# Patient Record
Sex: MALE | Race: WHITE | NOT HISPANIC OR LATINO | URBAN - METROPOLITAN AREA
[De-identification: names, ages, dates, MRNs, and addresses within clinical notes are randomized per-mention and may not be internally consistent; named-entity substitution may affect disease eponyms.]

---

## 2019-04-01 ENCOUNTER — EMERGENCY (EMERGENCY)
Facility: HOSPITAL | Age: 29
LOS: 1 days | Discharge: HOME | End: 2019-04-01
Attending: EMERGENCY MEDICINE
Payer: COMMERCIAL

## 2019-04-01 VITALS
OXYGEN SATURATION: 96 % | SYSTOLIC BLOOD PRESSURE: 126 MMHG | TEMPERATURE: 98 F | HEART RATE: 78 BPM | RESPIRATION RATE: 16 BRPM | DIASTOLIC BLOOD PRESSURE: 78 MMHG

## 2019-04-01 VITALS
WEIGHT: 225.09 LBS | HEART RATE: 88 BPM | OXYGEN SATURATION: 96 % | RESPIRATION RATE: 18 BRPM | DIASTOLIC BLOOD PRESSURE: 82 MMHG | SYSTOLIC BLOOD PRESSURE: 120 MMHG | TEMPERATURE: 98 F | HEIGHT: 74 IN

## 2019-04-01 PROCEDURE — 99282 EMERGENCY DEPT VISIT SF MDM: CPT

## 2019-04-01 PROCEDURE — 99283 EMERGENCY DEPT VISIT LOW MDM: CPT

## 2019-04-01 NOTE — ED PROVIDER NOTE - NS ED ROS FT
Constitutional: no fever and no chills  Eyes: (+) L eye pain and blurry vision, no discharge  ENMT: no ear pain or hearing loss, no dysphagia or throat pain  Neck: no pain, no stiffness, no swollen glands  CV: no chest pain, no palpitations, no edema  Resp: no cough, no shortness of breath  Abd: no abdominal pain, no nausea or vomiting, no diarrhea  : no dysuria, no hematuria  MSK: no back pain, no neck pain, no joint pain  Neuro: no LOC, no gait abnormality, no headache, no sensory deficits, no weakness  Skin: no rashes, no lacerations, no lesions

## 2019-04-01 NOTE — ED ADULT NURSE NOTE - OBJECTIVE STATEMENT
29y/o male walked into ED a&ox3 c/o eye injury. 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 States he did not feel much discomfort at the time but woke up Saturday with severe sharp pain to left eye that has been getting progressively worse. Also endorses blurry vision to left eye. Has tried flushing out eye with no relief. Went to urgent care today and was told he failed the vision test via Snellen chart. Sent here for further eval by optho. Patient presents with watery reddened left eye. Reports sharp pain to affected eye. Denies any other complaints at this time. 27y/o male walked into ED a&ox3 c/o eye injury. 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 States he did not feel much discomfort at the time but woke up Saturday with severe sharp pain to left eye that has been getting progressively worse. Also endorses blurry vision to left eye. Has tried flushing out eye with no relief. Went to urgent care today and was told he failed the vision test via Snellen chart. Sent here for further eval by optho. Patient presents with watery reddened left eye. Reports sharp pain to affected eye. Denies any other complaints at this time. MD Koroma at bedside for eval

## 2019-04-01 NOTE — ED PROVIDER NOTE - NSFOLLOWUPCLINICS_GEN_ALL_ED_FT
[facility]  Ophthalmology  [address] [place] [address] [address]  [place] [address]  Phone: [phone]  Fax:   Follow Up Time: 1-3 Days

## 2019-04-01 NOTE — ED PROVIDER NOTE - OBJECTIVE STATEMENT
29 y/o M no PMH presenting with L eye pain worsening over past 4 days due to possible foreign body. 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 Also with worsening blurry vision in that eye. He denies any fevers/chills, headache, eye discharge, ear pain, throat pain, neck pain, chest pain, SOB, abd pain, n/v/d, skin rash or any other complaints. 27 y/o M no PMH presenting with L eye pain worsening over past 4 days due to possible foreign body. 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 Also with worsening blurry vision in that eye. He denies any fevers/chills, headache, eye discharge, ear pain, throat pain, neck pain, chest pain, SOB, abd pain, n/v/d, skin rash or any other complaints.

## 2019-04-01 NOTE — ED PROVIDER NOTE - PHYSICAL EXAMINATION
PHYSICAL EXAM:  GENERAL: Sitting comfortable in bed, in no acute distress  HENMT: Atraumatic, moist mucous membranes, no oropharyngeal exudates or vesicles, uvula is midline\  EYES: (+) left injected sclera, visual acuity 20/40 on left and 20/25 on right, PERRL, EOMs intact b/l  HEART: RRR, S1/S2, no murmur/gallops/rubs  RESPIRATORY: Clear to auscultation bilaterally, no wheezes/rhonchi/rales  ABDOMEN: +BS, soft, nontender, nondistended  EXTREMITIES: No lower extremity edema, +2 radial pulses b/l  NEURO:  A&Ox4, no focal motor deficits or sensory deficits   Heme/LYMPH: No ecchymosis or bruising, no anterior/posterior cervical or supraclavicular LAD  SKIN:  Skin normal color for race, warm, dry and intact. No evidence of rash.

## 2019-04-01 NOTE — ED PROVIDER NOTE - NSFOLLOWUPINSTRUCTIONS_ED_ALL_ED_FT
You were seen in the ER for eye pain after possibly getting cement in it at work. We irrigated your eye with 3L of fluid and did a slit lamp exam and staining that showed no foreign bodies or evidence of abrasion or ulceration of your eye. You will be discharged home and should follow-up with ophthalmology clinic tomorrow (contact information to make appointment for tomorrow above). You can use over the counter eye drop lubricant for comfort. Return to the ER for severe worsening eye pain/irritation, loss of vision, fevers, eye discharge, severe headache, vomiting, or any other new or concerning symptoms.

## 2019-04-01 NOTE — ED PROVIDER NOTE - PROGRESS NOTE DETAILS
Eye irrigation via sarita flush catheter 1L initiated. Eye fluid pH 6 prior to irrigation starting. Eye fluid pH 7, will give another 1L eye irrigation. Eye fluid pH 7, pt reports sensation foreign object in eye, none visualized on gross exam. Will continue with another 1L eye irrigation. Pt reports pain discomfort somewhat improved with irrigation but persists. Fluorescein and slit lamp exam performed with no obvious foreign body, corneal abrasion, or evidence of iritis. Pt will be discharged home with referral for ophtho clinic follow-up tomorrow and return precautions. Eye irrigation via sarita flush catheter 1L initiated, tetracaine drops for analgesia. Eye fluid pH 6 prior to irrigation starting. Eye fluid pH 7, pt reports persistent sensation of foreign object in eye, none visualized on gross exam. Will continue with another 1L eye irrigation.

## 2019-04-01 NOTE — ED PROVIDER NOTE - ATTENDING CONTRIBUTION TO CARE
I have seen and evaluated this patient with the resident.   I agree with the findings  unless other wise stated.  I have made appropriate changes in documentations where needed, After my face to face bedside evaluation, I am further  notin27 y/o M no PMH presenting with L eye pain worsening over past 4 days due to possible foreign body 2/2 cement from jackhammer at work. No fevers or other complaints. (+) L scleral injection on exam and decreased visual acuity. No obvious foreign bodies seen. Will initiate fluid irrigation of left eye through sarita flush catheter for possible alkali injury 2/2 cement and will follow with fluorescein and slit lamp examination. neg for abrasions or changes of irirtis  or ulcer will have eye clinic f/u --Koroma

## 2019-05-22 ENCOUNTER — APPOINTMENT (OUTPATIENT)
Dept: ORTHOPEDIC SURGERY | Facility: CLINIC | Age: 29
End: 2019-05-22
Payer: COMMERCIAL

## 2019-05-22 VITALS
DIASTOLIC BLOOD PRESSURE: 76 MMHG | WEIGHT: 225 LBS | HEIGHT: 74 IN | SYSTOLIC BLOOD PRESSURE: 118 MMHG | HEART RATE: 93 BPM | BODY MASS INDEX: 28.88 KG/M2

## 2019-05-22 DIAGNOSIS — Z87.891 PERSONAL HISTORY OF NICOTINE DEPENDENCE: ICD-10-CM

## 2019-05-22 DIAGNOSIS — S70.12XA CONTUSION OF LEFT THIGH, INITIAL ENCOUNTER: ICD-10-CM

## 2019-05-22 DIAGNOSIS — F19.90 OTHER PSYCHOACTIVE SUBSTANCE USE, UNSPECIFIED, UNCOMPLICATED: ICD-10-CM

## 2019-05-22 DIAGNOSIS — Z78.9 OTHER SPECIFIED HEALTH STATUS: ICD-10-CM

## 2019-05-22 DIAGNOSIS — S76.109A UNSPECIFIED INJURY OF UNSPECIFIED QUADRICEPS MUSCLE, FASCIA AND TENDON, INITIAL ENCOUNTER: ICD-10-CM

## 2019-05-22 PROCEDURE — 99203 OFFICE O/P NEW LOW 30 MIN: CPT

## 2019-05-22 PROCEDURE — 73552 X-RAY EXAM OF FEMUR 2/>: CPT | Mod: LT

## 2019-05-22 PROCEDURE — 73502 X-RAY EXAM HIP UNI 2-3 VIEWS: CPT | Mod: LT

## 2019-05-22 RX ORDER — OMEPRAZOLE 40 MG/1
CAPSULE, DELAYED RELEASE ORAL
Refills: 0 | Status: ACTIVE | COMMUNITY

## 2019-05-22 RX ORDER — MELOXICAM 15 MG/1
15 TABLET ORAL
Qty: 60 | Refills: 2 | Status: ACTIVE | COMMUNITY
Start: 2019-05-22 | End: 1900-01-01

## 2019-05-22 NOTE — DISCUSSION/SUMMARY
[de-identified] : 29-year-old male with left quadriceps contusion/strain, hematoma. Options discussed. Recommend rest, ice, Mobic, side effects discussed. He will follow up with Dr. Lai if symptoms are not improving within 2-3 weeks. All questions answered expresses understanding

## 2019-05-22 NOTE — PHYSICAL EXAM
[de-identified] : General Exam\par \par Well developed, well nourished\par No apparent distress\par Oriented to person, place, and time\par Mood: Normal\par Affect: Normal\par Balance and coordination: Normal\par Gait: Normal\par \par Left hip exam\par \par Skin: Clean/dry and intact\par Inspection: No obvious deformity, + swelling quadriceps, no ecchymosis.\par Tenderness: +ttp anterior quadriceps, no tenderness over greater trochanter/glut medius insertion. No tenderness pubic symphysis, pubic tubercle, hip flexors. No ttp ischial tuberosity or buttock. No ttp over the ASIS/[place] crest.\par ROM: 0-120°. Internal rotation 30 external rotation 70\par Painful ROM: None\par Additional tests: able to SLR, No pain with circumduction negative impingement test at 90° neg impingement test at 60° negative Doug [facility]\par Strength: 5/5 hip flexion/ADD/ABD/Q/H/TA/GS/EHL\par Neuro: Sensation in tact to light touch throughout in dp/sp/tib/angle/saph distributions\par Pulses: 2+ DP/PT pulses [de-identified] : 2 views L femur/hip obtained.  Preliminary report- no acute fx/dislocation noted. \par

## 2019-05-22 NOTE — HISTORY OF PRESENT ILLNESS
[de-identified] : 29-year-old male presents complaining of left anterior thigh pain for 2 days. He dropped a 90 pound jackhammer onto his quadriceps muscles. He developed immediate pain. No bruising developed. He does states there is swelling. Pain with ambulation. Overall symptoms are stable. He does not revealing a pop around the area. No hip pain. Denies numbness tingling, instability, weakness.\par \par The patient's past medical history, past surgical history, medications, allergies, and social history were reviewed by me today with the patient and documented accordingly. In addition, the patient's family history, which is noncontributory to this visit, was also reviewed.\par

## 2019-05-23 PROBLEM — Z78.9 OTHER SPECIFIED HEALTH STATUS: Chronic | Status: ACTIVE | Noted: 2019-04-01

## 2019-05-27 PROBLEM — S76.109A INJURY OF QUADRICEPS MUSCLE: Status: ACTIVE | Noted: 2019-05-22

## 2019-06-05 ENCOUNTER — APPOINTMENT (OUTPATIENT)
Dept: ORTHOPEDIC SURGERY | Facility: CLINIC | Age: 29
End: 2019-06-05

## 2019-12-11 ENCOUNTER — APPOINTMENT (OUTPATIENT)
Dept: PSYCHIATRY | Facility: CLINIC | Age: 29
End: 2019-12-11
Payer: COMMERCIAL

## 2019-12-11 DIAGNOSIS — Z81.8 FAMILY HISTORY OF OTHER MENTAL AND BEHAVIORAL DISORDERS: ICD-10-CM

## 2019-12-11 PROCEDURE — 99205 OFFICE O/P NEW HI 60 MIN: CPT

## 2019-12-13 PROBLEM — Z81.8 FAMILY HISTORY OF DEPRESSION: Status: ACTIVE | Noted: 2019-12-13

## 2020-01-02 ENCOUNTER — MEDICATION RENEWAL (OUTPATIENT)
Age: 30
End: 2020-01-02

## 2020-01-09 ENCOUNTER — APPOINTMENT (OUTPATIENT)
Dept: PSYCHIATRY | Facility: CLINIC | Age: 30
End: 2020-01-09

## 2020-01-22 ENCOUNTER — RX RENEWAL (OUTPATIENT)
Age: 30
End: 2020-01-22

## 2020-11-17 ENCOUNTER — APPOINTMENT (OUTPATIENT)
Dept: PSYCHIATRY | Facility: CLINIC | Age: 30
End: 2020-11-17

## 2020-11-24 ENCOUNTER — APPOINTMENT (OUTPATIENT)
Dept: PSYCHIATRY | Facility: CLINIC | Age: 30
End: 2020-11-24
Payer: COMMERCIAL

## 2020-11-24 DIAGNOSIS — F41.0 PANIC DISORDER [EPISODIC PAROXYSMAL ANXIETY]: ICD-10-CM

## 2020-11-24 DIAGNOSIS — F41.1 GENERALIZED ANXIETY DISORDER: ICD-10-CM

## 2020-11-24 PROCEDURE — 99214 OFFICE O/P EST MOD 30 MIN: CPT

## 2020-11-24 RX ORDER — ESCITALOPRAM OXALATE 10 MG/1
10 TABLET ORAL
Qty: 30 | Refills: 0 | Status: ACTIVE | COMMUNITY
Start: 2019-12-12 | End: 1900-01-01

## 2020-11-25 PROBLEM — F41.0 PANIC ATTACKS: Status: ACTIVE | Noted: 2019-12-12

## 2020-11-25 PROBLEM — F41.1 GAD (GENERALIZED ANXIETY DISORDER): Status: ACTIVE | Noted: 2019-12-12

## 2021-10-30 ENCOUNTER — APPOINTMENT (OUTPATIENT)
Dept: DISASTER EMERGENCY | Facility: OTHER | Age: 31
End: 2021-10-30
Payer: COMMERCIAL

## 2021-10-30 PROCEDURE — 0012A: CPT

## 2023-03-13 ENCOUNTER — RX ONLY (RX ONLY)
Age: 33
End: 2023-03-13

## 2023-03-13 ENCOUNTER — OFFICE (OUTPATIENT)
Dept: URBAN - METROPOLITAN AREA CLINIC 109 | Facility: CLINIC | Age: 33
Setting detail: OPHTHALMOLOGY
End: 2023-03-13
Payer: COMMERCIAL

## 2023-03-13 DIAGNOSIS — H00.14: ICD-10-CM

## 2023-03-13 PROCEDURE — 99203 OFFICE O/P NEW LOW 30 MIN: CPT | Performed by: OPHTHALMOLOGY

## 2023-03-13 ASSESSMENT — REFRACTION_AUTOREFRACTION
OD_SPHERE: 0.00
OD_CYLINDER: +1.00
OS_SPHERE: 0.00
OS_CYLINDER: +1.25
OD_AXIS: 023
OS_AXIS: 170

## 2023-03-13 ASSESSMENT — KERATOMETRY
METHOD_AUTO_MANUAL: AUTO
OS_AXISANGLE_DEGREES: 159
OD_K2POWER_DIOPTERS: 44.75
OS_K2POWER_DIOPTERS: 44.00
OS_K1POWER_DIOPTERS: 43.50
OD_K1POWER_DIOPTERS: 44.25
OD_AXISANGLE_DEGREES: 041

## 2023-03-13 ASSESSMENT — CONFRONTATIONAL VISUAL FIELD TEST (CVF)
OD_FINDINGS: FULL
OS_FINDINGS: FULL

## 2023-03-13 ASSESSMENT — SPHEQUIV_DERIVED
OS_SPHEQUIV: 0.625
OD_SPHEQUIV: 0.5

## 2023-03-13 ASSESSMENT — VISUAL ACUITY
OD_BCVA: 20/20-1
OS_BCVA: 20/20

## 2023-03-13 ASSESSMENT — AXIALLENGTH_DERIVED
OS_AL: 23.2613
OD_AL: 23.0429

## 2023-03-13 ASSESSMENT — LID EXAM ASSESSMENTS: OS_COMMENTS: UPPER LID EVERSION NO FOREIGN BODY OR REACTION

## 2024-12-13 ENCOUNTER — APPOINTMENT (OUTPATIENT)
Dept: CARDIOLOGY | Facility: CLINIC | Age: 34
End: 2024-12-13

## 2025-02-19 ENCOUNTER — APPOINTMENT (OUTPATIENT)
Dept: CARDIOLOGY | Facility: CLINIC | Age: 35
End: 2025-02-19
Payer: COMMERCIAL

## 2025-02-19 ENCOUNTER — NON-APPOINTMENT (OUTPATIENT)
Age: 35
End: 2025-02-19

## 2025-02-19 VITALS
OXYGEN SATURATION: 97 % | BODY MASS INDEX: 29.26 KG/M2 | SYSTOLIC BLOOD PRESSURE: 99 MMHG | RESPIRATION RATE: 17 BRPM | HEART RATE: 90 BPM | HEIGHT: 74 IN | WEIGHT: 228 LBS | DIASTOLIC BLOOD PRESSURE: 61 MMHG | TEMPERATURE: 98.6 F

## 2025-02-19 DIAGNOSIS — R00.2 PALPITATIONS: ICD-10-CM

## 2025-02-19 DIAGNOSIS — Z71.89 OTHER SPECIFIED COUNSELING: ICD-10-CM

## 2025-02-19 DIAGNOSIS — Z82.49 FAMILY HISTORY OF ISCHEMIC HEART DISEASE AND OTHER DISEASES OF THE CIRCULATORY SYSTEM: ICD-10-CM

## 2025-02-19 PROCEDURE — 99204 OFFICE O/P NEW MOD 45 MIN: CPT

## 2025-02-19 PROCEDURE — 36415 COLL VENOUS BLD VENIPUNCTURE: CPT

## 2025-02-19 PROCEDURE — 93000 ELECTROCARDIOGRAM COMPLETE: CPT

## 2025-02-21 PROBLEM — Z82.49 FAMILY HISTORY OF PREMATURE CAD: Status: ACTIVE | Noted: 2025-02-21

## 2025-02-24 LAB
APO LP(A) SERPL-MCNC: <9 NMOL/L
CHOLEST SERPL-MCNC: 170 MG/DL
CRP SERPL HS-MCNC: 0.35 MG/L
HDLC SERPL-MCNC: 39 MG/DL
LDLC SERPL CALC-MCNC: 114 MG/DL
NONHDLC SERPL-MCNC: 131 MG/DL
TRIGL SERPL-MCNC: 92 MG/DL

## 2025-04-04 ENCOUNTER — APPOINTMENT (OUTPATIENT)
Dept: CARDIOLOGY | Facility: CLINIC | Age: 35
End: 2025-04-04
Payer: COMMERCIAL

## 2025-04-04 PROCEDURE — 93015 CV STRESS TEST SUPVJ I&R: CPT

## 2025-04-04 PROCEDURE — ZZZZZ: CPT

## 2025-04-04 PROCEDURE — 93306 TTE W/DOPPLER COMPLETE: CPT
